# Patient Record
Sex: MALE | Race: WHITE | Employment: FULL TIME | URBAN - METROPOLITAN AREA
[De-identification: names, ages, dates, MRNs, and addresses within clinical notes are randomized per-mention and may not be internally consistent; named-entity substitution may affect disease eponyms.]

---

## 2016-06-15 LAB
HEP C HIM: NORMAL
HIV 1&2 EXT: NORMAL

## 2019-10-29 ENCOUNTER — TRANSFERRED RECORDS (OUTPATIENT)
Dept: MULTI SPECIALTY CLINIC | Facility: CLINIC | Age: 54
End: 2019-10-29

## 2019-10-29 LAB
CREAT SERPL-MCNC: 1.07 MG/DL (ref 0.72–1.25)
GFR SERPL CREATININE-BSD FRML MDRD: >60 ML/MIN/1.73M2
GLUCOSE SERPL-MCNC: 98 MG/DL (ref 65–100)
POTASSIUM SERPL-SCNC: 4.3 MMOL/L (ref 3.5–5)

## 2020-10-05 ENCOUNTER — OFFICE VISIT (OUTPATIENT)
Dept: FAMILY MEDICINE | Facility: CLINIC | Age: 55
End: 2020-10-05
Payer: COMMERCIAL

## 2020-10-05 VITALS
BODY MASS INDEX: 43.03 KG/M2 | HEART RATE: 73 BPM | TEMPERATURE: 97.3 F | OXYGEN SATURATION: 96 % | WEIGHT: 300.6 LBS | SYSTOLIC BLOOD PRESSURE: 118 MMHG | HEIGHT: 70 IN | RESPIRATION RATE: 20 BRPM | DIASTOLIC BLOOD PRESSURE: 82 MMHG

## 2020-10-05 DIAGNOSIS — E66.01 MORBID OBESITY (H): ICD-10-CM

## 2020-10-05 DIAGNOSIS — M70.51 PES ANSERINUS BURSITIS OF RIGHT KNEE: Primary | ICD-10-CM

## 2020-10-05 PROCEDURE — 99203 OFFICE O/P NEW LOW 30 MIN: CPT | Performed by: FAMILY MEDICINE

## 2020-10-05 RX ORDER — IPRATROPIUM BROMIDE 42 UG/1
2 SPRAY, METERED NASAL DAILY
COMMUNITY
Start: 2020-06-30

## 2020-10-05 ASSESSMENT — PAIN SCALES - GENERAL: PAINLEVEL: MODERATE PAIN (4)

## 2020-10-05 ASSESSMENT — MIFFLIN-ST. JEOR: SCORE: 2204.76

## 2020-10-05 NOTE — PATIENT INSTRUCTIONS
Patient Education     Understanding Pes Anserine Bursitis    A bursa is a thin, slippery, sac-like film that contains a small amount of fluid. A bursa is found between bones and soft tissues in and around joints. It cushions and protects joint structures and stops them from rubbing against each other. If a bursa becomes inflamed and irritated, it is known as bursitis.  The pes anserine bursa is found on the inside of the knee joint. It lies between the shinbone (tibia) and 3 tendons that attach the hamstring muscles to the shinbone. Inflammation of this bursa is called pes anserine bursitis.  Causes of pes anserine bursitis  These may include:    Overuse of the knee during running or other sports    Sports that require repeated side-to-side motions, such as in tennis or soccer    Being overweight    Having knee arthritis    Having MCL (medial collateral ligament) injury  Symptoms of pes anserine bursitis  The knee joint may be painful. This pain may be worse with kneeling, going up or down stairs, or getting up from a chair. The pain often gets better with rest. The side of the joint may be swollen. It may also be tender and feel warm to the touch.  Treatment for pes anserine bursitis  Treatments may include:    Resting the knee. This lessens irritation and gives the bursa time to heal.    Sleeping with a cushion between the thighs. This limits pressure on the bursa.    Prescription or over-the-counter pain medicines. These help reduce inflammation, swelling, and pain.    A weight-loss plan if you are overweight. This relieves pressure on the knee joint.    Stretching and strengthening exercises. These help improve the strength and flexibility of the muscles around the knee.    Cold therapy, such as using ice packs. This helps reduce swelling and pain.    Physical therapy. This may include exercises or ultrasound.    Injections of medicine into the bursa. These may help relieve symptoms.  For symptoms that don t  get better with these treatments, your healthcare provider may recommend surgery to remove the bursa.  Possible complications  If you don t give your knee time to heal, symptoms may return or get worse. Follow your healthcare provider s instructions on resting and treating your knee.     When to call your healthcare provider  Call your healthcare provider if:    Symptoms don t get better or get worse    New symptoms develop    Fever, chills or drainage occurs   Date Last Reviewed: 3/10/2016    8219-1252 [employer]. [address] [place] [id number]. All rights reserved. This information is not intended as a substitute for professional medical care. Always follow your healthcare professional's instructions.

## 2020-10-05 NOTE — Clinical Note
Colonoscopy 9/16/16  Chemistry common- 10/29/19  HIV, Hep C- 6/15/16- found on completed list [facility]

## 2020-10-05 NOTE — PROGRESS NOTES
SUBJECTIVE:  Chencho Cameron is a 55 year old male who is seen as self referral for right knee pain that started  3 weeks ago. The onset of the pain was gradual.  He denies any injury  The pain was first noticed the day(s) after he was cutting wood with a chainsaw.     He has some plantar fasciitis in the right foot     The patient reports that these symptoms have been stable since that time. He denies popping, clicking or locking of the knee.  Palliative factors for the pain include:  Ibuprofen   Provacative factors include: walking and getting up and walking   The patient relates a prior history of problems in this knee approximately 3-4 years ago. He did not have surgery.       The patient reports that he is employed as a general cont and does not have a physical job that demands use of the affected knee.  The patients past medical, surgical, social and family histories were reviewed.  Social History     Socioeconomic History     Marital status:      Spouse name: None     Number of children: None     Years of education: None     Highest education level: None   Occupational History     None   Social Needs     Financial resource strain: None     Food insecurity     Worry: None     Inability: None     Transportation needs     Medical: None     Non-medical: None   Tobacco Use     Smoking status: Never Smoker     Smokeless tobacco: Former User     Types: Snuff   Substance and Sexual Activity     Alcohol use: Yes     Drug use: Never     Sexual activity: Yes     Partners: Female   Lifestyle     Physical activity     Days per week: None     Minutes per session: None     Stress: None   Relationships     Social connections     Talks on phone: None     Gets together: None     Attends [employer] service: None     Active member of club or organization: None     Attends meetings of clubs or organizations: None     Relationship status: None     Intimate partner violence     Fear of current or ex partner: None     Emotionally  "abused: None     Physically abused: None     Forced sexual activity: None   Other Topics Concern     None   Social History Narrative     None         REVIEW OF SYSTEMS:  CONSTITUTIONAL:NEGATIVE for fever, chills, change in weight  INTEGUMENTARY/SKIN: NEGATIVE for worrisome rashes, moles or lesions  MUSCULOSKELETAL:See HPI above  NEURO: NEGATIVE for weakness, dizziness or paresthesias    OBJECTIVE:  /82   Pulse 73   Temp 97.3  F (36.3  C) (Tympanic)   Resp 20   Ht 1.778 m (5' 10\")   Wt 136.4 kg (300 lb 9.6 oz)   SpO2 96%   BMI 43.13 kg/m    GENERAL APPEARANCE: healthy, alert and no distress  GAIT: NORMAL   SKIN: no suspicious lesions or rashes  NEURO: Normal strength and tone, mentation intact and speech normal  PSYCH:  mentation appears normal and affect normal/bright  KNEE EXAM:  Inspection: AP/lateral alignment normal, No effusion, No quad atrophy  Tender: pes anserine bursa  Non-tender: lateral patellar facet, medial patellar facet, inferior pole patella, patella tendon, quadriceps insertion, MCL, LCL, lateral joint line, medial joint line, medial tibial plateau, lateral tibial plateau, medial femoral condyle, lateral femoral condyle, distal IT band, prepatellar bursa, popliteal region  Active Range of Motion: full flexion, full extension    Special tests: no apprehension with lateral stress of the patella, no apprehension with medial stress of the patella, normal Valgus stress test, normal Varus, negative Lachman's test, negative anterior drawer test, negative Steffany's         X-RAY INTERPRETATION:  No Knee X-Rays indicated    ASSESSMENT/PLAN:  pes anserine bursitis    NSAID was prescribed,   An educational reference regarding this subject was printed from [employer] and given to the patient.      Follow up in 2-3  weeks if needed     "